# Patient Record
Sex: FEMALE | Race: WHITE | NOT HISPANIC OR LATINO
[De-identification: names, ages, dates, MRNs, and addresses within clinical notes are randomized per-mention and may not be internally consistent; named-entity substitution may affect disease eponyms.]

---

## 2020-06-03 DIAGNOSIS — Z30.41 ENCOUNTER FOR SURVEILLANCE OF CONTRACEPTIVE PILLS: ICD-10-CM

## 2020-06-03 PROBLEM — Z00.00 ENCOUNTER FOR PREVENTIVE HEALTH EXAMINATION: Status: ACTIVE | Noted: 2020-06-03

## 2020-06-18 ENCOUNTER — APPOINTMENT (OUTPATIENT)
Dept: OBGYN | Facility: CLINIC | Age: 50
End: 2020-06-18
Payer: COMMERCIAL

## 2020-06-18 VITALS
HEIGHT: 65 IN | HEART RATE: 83 BPM | SYSTOLIC BLOOD PRESSURE: 143 MMHG | DIASTOLIC BLOOD PRESSURE: 92 MMHG | BODY MASS INDEX: 29.66 KG/M2 | WEIGHT: 178 LBS | TEMPERATURE: 97.6 F

## 2020-06-18 DIAGNOSIS — Z80.3 FAMILY HISTORY OF MALIGNANT NEOPLASM OF BREAST: ICD-10-CM

## 2020-06-18 DIAGNOSIS — Z83.3 FAMILY HISTORY OF DIABETES MELLITUS: ICD-10-CM

## 2020-06-18 DIAGNOSIS — K57.90 DIVERTICULOSIS OF INTESTINE, PART UNSPECIFIED, W/OUT PERFORATION OR ABSCESS W/OUT BLEEDING: ICD-10-CM

## 2020-06-18 DIAGNOSIS — Z01.419 ENCOUNTER FOR GYNECOLOGICAL EXAMINATION (GENERAL) (ROUTINE) W/OUT ABNORMAL FINDINGS: ICD-10-CM

## 2020-06-18 DIAGNOSIS — Z82.49 FAMILY HISTORY OF ISCHEMIC HEART DISEASE AND OTHER DISEASES OF THE CIRCULATORY SYSTEM: ICD-10-CM

## 2020-06-18 PROCEDURE — 99396 PREV VISIT EST AGE 40-64: CPT

## 2020-06-18 RX ORDER — HYDROCHLOROTHIAZIDE 12.5 MG/1
12.5 TABLET ORAL
Refills: 0 | Status: ACTIVE | COMMUNITY

## 2020-06-18 RX ORDER — OMEPRAZOLE 40 MG/1
40 CAPSULE, DELAYED RELEASE ORAL
Refills: 0 | Status: ACTIVE | COMMUNITY

## 2020-06-18 RX ORDER — CANDESARTAN CILEXETIL 32 MG/1
32 TABLET ORAL
Refills: 0 | Status: ACTIVE | COMMUNITY

## 2020-06-18 NOTE — CHIEF COMPLAINT
[Annual Visit] : annual visit [FreeTextEntry1] : Pt is here for annual exam. Overall pt feels good.

## 2020-06-18 NOTE — HISTORY OF PRESENT ILLNESS
[Definite:  ___ (Date)] : the last menstrual period was [unfilled] [Menarche Age: ____] : age at menarche was [unfilled] [Sexually Active] : is sexually active [Contraception] : uses contraception [Oral Contraceptives] : uses oral contraceptives [Male ___] : [unfilled] male

## 2020-06-23 LAB — HPV HIGH+LOW RISK DNA PNL CVX: NOT DETECTED

## 2020-08-14 DIAGNOSIS — Z86.19 PERSONAL HISTORY OF OTHER INFECTIOUS AND PARASITIC DISEASES: ICD-10-CM

## 2020-08-14 RX ORDER — TERCONAZOLE 8 MG/G
0.8 CREAM VAGINAL
Qty: 1 | Refills: 0 | Status: ACTIVE | COMMUNITY
Start: 2020-08-14 | End: 1900-01-01

## 2020-08-16 RX ORDER — TERCONAZOLE 4 MG/G
0.4 CREAM VAGINAL
Qty: 1 | Refills: 0 | Status: ACTIVE | COMMUNITY
Start: 2020-08-14 | End: 1900-01-01

## 2020-09-02 ENCOUNTER — RX RENEWAL (OUTPATIENT)
Age: 50
End: 2020-09-02

## 2020-12-23 PROBLEM — Z86.19 HISTORY OF CANDIDIASIS OF VAGINA: Status: RESOLVED | Noted: 2020-08-14 | Resolved: 2020-12-23

## 2021-01-27 ENCOUNTER — APPOINTMENT (OUTPATIENT)
Dept: HEMATOLOGY ONCOLOGY | Facility: CLINIC | Age: 51
End: 2021-01-27
Payer: COMMERCIAL

## 2021-01-27 ENCOUNTER — OUTPATIENT (OUTPATIENT)
Dept: OUTPATIENT SERVICES | Facility: HOSPITAL | Age: 51
LOS: 1 days | Discharge: HOME | End: 2021-01-27

## 2021-01-27 VITALS
DIASTOLIC BLOOD PRESSURE: 85 MMHG | HEART RATE: 81 BPM | HEIGHT: 65 IN | WEIGHT: 173 LBS | BODY MASS INDEX: 28.82 KG/M2 | RESPIRATION RATE: 14 BRPM | SYSTOLIC BLOOD PRESSURE: 115 MMHG | TEMPERATURE: 97.6 F

## 2021-01-27 DIAGNOSIS — Z80.6 FAMILY HISTORY OF LEUKEMIA: ICD-10-CM

## 2021-01-27 DIAGNOSIS — D50.9 IRON DEFICIENCY ANEMIA, UNSPECIFIED: ICD-10-CM

## 2021-01-27 PROCEDURE — 99243 OFF/OP CNSLTJ NEW/EST LOW 30: CPT

## 2021-01-27 RX ORDER — FLUCONAZOLE 150 MG/1
150 TABLET ORAL
Qty: 1 | Refills: 0 | Status: COMPLETED | COMMUNITY
Start: 2020-08-14 | End: 2021-01-27

## 2021-01-27 RX ORDER — NORETHINDRONE ACETATE/ETHINYL ESTRADIOL AND FERROUS FUMARATE 1MG-20(21)
1-20 KIT ORAL DAILY
Qty: 3 | Refills: 0 | Status: COMPLETED | COMMUNITY
Start: 2020-06-03 | End: 2021-01-27

## 2021-01-27 RX ORDER — ATORVASTATIN CALCIUM 10 MG/1
10 TABLET, FILM COATED ORAL
Refills: 0 | Status: ACTIVE | COMMUNITY

## 2021-01-29 NOTE — PHYSICAL EXAM
[Fully active, able to carry on all pre-disease performance without restriction] : Status 0 - Fully active, able to carry on all pre-disease performance without restriction [Normal] : affect appropriate [de-identified] : She has a rash on hands mainly, possibly due to Eczema  less likely  Dermatitis herpetiformis

## 2021-01-29 NOTE — CONSULT LETTER
[Dear  ___] : Dear  [unfilled], [Consult Letter:] : I had the pleasure of evaluating your patient, [unfilled]. [Please see my note below.] : Please see my note below. [Consult Closing:] : Thank you very much for allowing me to participate in the care of this patient.  If you have any questions, please do not hesitate to contact me. [Sincerely,] : Sincerely, [FreeTextEntry3] : Ovi

## 2021-01-29 NOTE — REVIEW OF SYSTEMS
[Skin Rash] : skin rash [Negative] : Allergic/Immunologic [de-identified] : on hands mainly but present on other parts of body as well

## 2021-01-29 NOTE — ASSESSMENT
[FreeTextEntry1] : #DEVIKA, chronic , maybe due to long stating use of PPI leading to Iron malabsorption especially since anemia is very mild on top of menstrual blood loss\par -given the rash which does not seem to be Dermatitis  hepatoformis but possible will check Serology for Celiac\par -she just started oral iron and I suggested she takes it every other day, empty stomach and will recheck in 6-8 weeks and if no improvement will arrange for IV iron\par -she just has endoscopic evaluations which she reports are normal.   Will review once avaialbe and would need to see path as well from EGD. \par -she will see derm as well for the rash\par -will call with blood work results in 6-8 weeks and will arrange RTC based on results

## 2021-01-29 NOTE — HISTORY OF PRESENT ILLNESS
[de-identified] : CC:  I have iron deficiency\par \par She is here at the request of Mandie Iyer \par \par She has history of DEVIKA going back to 2015 she saw Dr. Dailey and had IV iron. She had  some hevy  periods than but  now normal. She took Oral iron in past but has GI issues and had IV iron in past.  She was on PPI as well for many years\par \par She was tested for celic many time and negative, she had  pylori in past. \par \par She is now on oral iron again for 2 weeks,now every other day , slow Fe. \par \par colonoscopy: 10/2020 and EGD 1/2021  Dr. Melendez. Showed GERD\par \par Blood work: Jan 2015"  ferritin was 1,  hgb was 10.4\par \par Most recent blood work: [facility] 12/22/20 WBC 7.8, Hgb 11.6, MCV 78 ferritin was  9, UA negative for blood.  B12 normal.\par \par She denies any obvious bleeding.

## 2021-02-08 DIAGNOSIS — D50.9 IRON DEFICIENCY ANEMIA, UNSPECIFIED: ICD-10-CM

## 2021-09-02 ENCOUNTER — TRANSCRIPTION ENCOUNTER (OUTPATIENT)
Age: 51
End: 2021-09-02

## 2021-11-12 ENCOUNTER — APPOINTMENT (OUTPATIENT)
Dept: OBGYN | Facility: CLINIC | Age: 51
End: 2021-11-12
Payer: COMMERCIAL

## 2021-11-12 VITALS
HEART RATE: 79 BPM | SYSTOLIC BLOOD PRESSURE: 126 MMHG | TEMPERATURE: 98.1 F | HEIGHT: 65 IN | WEIGHT: 185 LBS | DIASTOLIC BLOOD PRESSURE: 82 MMHG | BODY MASS INDEX: 30.82 KG/M2

## 2021-11-12 DIAGNOSIS — K21.9 GASTRO-ESOPHAGEAL REFLUX DISEASE W/OUT ESOPHAGITIS: ICD-10-CM

## 2021-11-12 DIAGNOSIS — I10 ESSENTIAL (PRIMARY) HYPERTENSION: ICD-10-CM

## 2021-11-12 DIAGNOSIS — L30.9 DERMATITIS, UNSPECIFIED: ICD-10-CM

## 2021-11-12 DIAGNOSIS — E78.00 PURE HYPERCHOLESTEROLEMIA, UNSPECIFIED: ICD-10-CM

## 2021-11-12 PROCEDURE — 99212 OFFICE O/P EST SF 10 MIN: CPT | Mod: 25

## 2021-11-12 PROCEDURE — 99396 PREV VISIT EST AGE 40-64: CPT

## 2021-11-12 RX ORDER — DUPILUMAB 300 MG/2ML
300 INJECTION, SOLUTION SUBCUTANEOUS
Refills: 0 | Status: ACTIVE | COMMUNITY

## 2021-11-12 NOTE — PHYSICAL EXAM
[Appropriately responsive] : appropriately responsive [Alert] : alert [No Acute Distress] : no acute distress [No Lymphadenopathy] : no lymphadenopathy [Soft] : soft [Non-tender] : non-tender [Non-distended] : non-distended [No HSM] : No HSM [No Lesions] : no lesions [No Mass] : no mass [Oriented x3] : oriented x3 [Examination Of The Breasts] : a normal appearance [No Discharge] : no discharge [No Masses] : no breast masses were palpable [Labia Majora] : normal [Labia Minora] : normal [No Bleeding] : There was no active vaginal bleeding [Normal] : normal [Uterine Adnexae] : normal [FreeTextEntry6] : wnl

## 2021-11-12 NOTE — DISCUSSION/SUMMARY
[FreeTextEntry1] : 50 yo p0 with plevic pain , r/o PMB and uterine polyp, \par -options of  D &C hysterosono  vs hysterosonogram with EMB  \par -will schedule \par - pap \par - mammogram report\par -herbal supplements vs gabapentin for hot flashes\par \par

## 2021-11-12 NOTE — HISTORY OF PRESENT ILLNESS
[Patient reported mammogram was normal] : Patient reported mammogram was normal [No] : never pregnant [Irregular Menstrual Interval] : irregular menstrual interval [Hot Flashes] : hot flashes [Menarche Age: ____] : age at menarche was [unfilled] [FreeTextEntry1] : 52yo P-0 LMP-   Last period before that was 2020 , had few spotting episodes and full blown period 10/21 for 5 days since her last period.\par PCP saw patient when she had right breast pain - did [facility] in [facility] 10/23/21 wnl per pt\par She also did blood work and LFT's were elevated and was sent for pelvic sono where polyp was found.\par \par She has some lower back pain. She experiences hot flashes, vaginal dryness , some urinary urgency to urinate since 9/21 could not hold it , not losing urine when coughing laughing and sneezing.  U dip was negative.\par Has RLQ  pain and near umbilicus had this on and off last 2 month.\par  [TextBox_4] : GYNHX\par No history of fibroids, cysts, or STDs\par  [Mammogramdate] : 11/2021 [PapSmeardate] : 10/2021 [ColonoscopyDate] : 2020 [TextBox_9] : 11

## 2021-11-12 NOTE — REVIEW OF SYSTEMS
FREE:[LAST:[missy],FIRST:[magaly],PHONE:[phone]],FAX:[phone]],ADDRESS:[[address]  [address] [place] [address]]] [Abdominal Pain] : abdominal pain [Frequency] : frequency [Pelvic pain] : pelvic pain [Negative] : Heme/Lymph

## 2021-11-16 LAB — HPV HIGH+LOW RISK DNA PNL CVX: NOT DETECTED

## 2021-11-17 ENCOUNTER — APPOINTMENT (OUTPATIENT)
Dept: OBGYN | Facility: CLINIC | Age: 51
End: 2021-11-17
Payer: COMMERCIAL

## 2021-11-17 ENCOUNTER — TRANSCRIPTION ENCOUNTER (OUTPATIENT)
Age: 51
End: 2021-11-17

## 2021-11-17 VITALS
TEMPERATURE: 97.9 F | BODY MASS INDEX: 30.82 KG/M2 | SYSTOLIC BLOOD PRESSURE: 129 MMHG | DIASTOLIC BLOOD PRESSURE: 80 MMHG | WEIGHT: 185 LBS | HEART RATE: 73 BPM | HEIGHT: 65 IN

## 2021-11-17 DIAGNOSIS — N84.0 POLYP OF CORPUS UTERI: ICD-10-CM

## 2021-11-17 DIAGNOSIS — Z30.9 ENCOUNTER FOR CONTRACEPTIVE MANAGEMENT, UNSPECIFIED: ICD-10-CM

## 2021-11-17 PROCEDURE — 76830 TRANSVAGINAL US NON-OB: CPT | Mod: 59

## 2021-11-17 PROCEDURE — 99213 OFFICE O/P EST LOW 20 MIN: CPT

## 2021-11-17 PROCEDURE — 76831 ECHO EXAM UTERUS: CPT

## 2021-11-17 PROCEDURE — 76998 US GUIDE INTRAOP: CPT | Mod: 59

## 2021-11-17 NOTE — ASSESSMENT
[FreeTextEntry1] : 52 yo po with abnormal bleeding , suspected uterine polyp\par \par Allergies:NKDA\par \par Consent: Obtained [] Yes\par \par Chief Complaint: This patient is a 52 yo presence for  sonoguided endometrial biopsy , sonohysterogram , PMB\par Procedure: Standard Technique:\par Transvaginal ultrasound performed first. Speculum placed & the vaginal vault was prepped with a betadine prep & wiped three times. Single tooth tenaculum was used to grasp the anterior lip of the cervix.\par \par SHG catheter placed & inflated with 3cc of air. Saline used for distention. TVU images obtained. Hemostasis was checked & secured. All instruments were removed. Patient tolerated procedure well. Patient was given written follow up instructions.\par \par Findings:   fundal uterine polyp was noted during procedure                                                     \par Cervix: wnl\par Endometrium:  mm\par no other abnormalities seen\par

## 2021-11-17 NOTE — PLAN
[FreeTextEntry1] : 50 yo p0 s/p hysterosonogram to r/o uterine polyp  and with abnormal bleeding history\par hysterosono- polyp\par - d &C hysteroscopy , possible myosure to schedule

## 2021-11-18 ENCOUNTER — ASOB RESULT (OUTPATIENT)
Age: 51
End: 2021-11-18

## 2021-11-18 PROBLEM — Z30.9 CONTRACEPTIVE MANAGEMENT: Status: ACTIVE | Noted: 2020-06-18

## 2021-11-18 PROBLEM — N84.0 UTERINE POLYP: Status: ACTIVE | Noted: 2021-11-17

## 2021-11-22 ENCOUNTER — NON-APPOINTMENT (OUTPATIENT)
Age: 51
End: 2021-11-22

## 2021-11-29 ENCOUNTER — NON-APPOINTMENT (OUTPATIENT)
Age: 51
End: 2021-11-29

## 2021-12-01 LAB — CYTOLOGY CVX/VAG DOC THIN PREP: NORMAL

## 2023-09-26 ENCOUNTER — NON-APPOINTMENT (OUTPATIENT)
Age: 53
End: 2023-09-26

## 2023-11-19 ENCOUNTER — NON-APPOINTMENT (OUTPATIENT)
Age: 53
End: 2023-11-19

## 2023-12-17 ENCOUNTER — NON-APPOINTMENT (OUTPATIENT)
Age: 53
End: 2023-12-17

## 2024-03-26 ENCOUNTER — NON-APPOINTMENT (OUTPATIENT)
Age: 54
End: 2024-03-26

## 2025-07-16 ENCOUNTER — NON-APPOINTMENT (OUTPATIENT)
Age: 55
End: 2025-07-16